# Patient Record
Sex: MALE | URBAN - METROPOLITAN AREA
[De-identification: names, ages, dates, MRNs, and addresses within clinical notes are randomized per-mention and may not be internally consistent; named-entity substitution may affect disease eponyms.]

---

## 2022-07-07 ENCOUNTER — APPOINTMENT (RX ONLY)
Dept: URBAN - METROPOLITAN AREA CLINIC 42 | Facility: CLINIC | Age: 22
Setting detail: DERMATOLOGY
End: 2022-07-07

## 2022-07-07 DIAGNOSIS — L81.6 OTHER DISORDERS OF DIMINISHED MELANIN FORMATION: ICD-10-CM

## 2022-07-07 PROCEDURE — ? PHOTO-DOCUMENTATION

## 2022-07-07 PROCEDURE — ? TREATMENT REGIMEN

## 2022-07-07 PROCEDURE — ? COUNSELING

## 2022-07-07 PROCEDURE — ? PRESCRIPTION

## 2022-07-07 PROCEDURE — 99204 OFFICE O/P NEW MOD 45 MIN: CPT

## 2022-07-07 RX ORDER — CLINDAMYCIN PHOSPHATE 10 MG/ML
LOTION TOPICAL BID
Qty: 60 | Refills: 3 | Status: ERX | COMMUNITY
Start: 2022-07-07

## 2022-07-07 RX ORDER — BENZOYL PEROXIDE 5 G/100G
GEL TOPICAL BID
Qty: 60 | Refills: 3 | Status: ERX | COMMUNITY
Start: 2022-07-07

## 2022-07-07 RX ADMIN — CLINDAMYCIN PHOSPHATE: 10 LOTION TOPICAL at 00:00

## 2022-07-07 RX ADMIN — BENZOYL PEROXIDE: 5 GEL TOPICAL at 00:00

## 2022-07-07 ASSESSMENT — LOCATION SIMPLE DESCRIPTION DERM
LOCATION SIMPLE: RIGHT BUTTOCK
LOCATION SIMPLE: LEFT BUTTOCK
LOCATION SIMPLE: RIGHT LOWER BACK

## 2022-07-07 ASSESSMENT — LOCATION DETAILED DESCRIPTION DERM
LOCATION DETAILED: LEFT BUTTOCK
LOCATION DETAILED: RIGHT SUPERIOR MEDIAL LOWER BACK
LOCATION DETAILED: RIGHT BUTTOCK

## 2022-07-07 ASSESSMENT — LOCATION ZONE DERM: LOCATION ZONE: TRUNK

## 2022-07-07 NOTE — PROCEDURE: TREATMENT REGIMEN
Detail Level: Zone
Otc Regimen: PanOxl
Initiate Treatment: Clindamycin lotion  mixed with Benzoyl Peroxide gel BID